# Patient Record
Sex: MALE | Race: WHITE | Employment: OTHER
[De-identification: names, ages, dates, MRNs, and addresses within clinical notes are randomized per-mention and may not be internally consistent; named-entity substitution may affect disease eponyms.]

---

## 2021-05-03 ENCOUNTER — TRANSCRIBE ORDERS (OUTPATIENT)
Dept: OTHER | Age: 81
End: 2021-05-03

## 2021-05-03 DIAGNOSIS — Z95.1 S/P CABG (CORONARY ARTERY BYPASS GRAFT): Primary | ICD-10-CM

## 2021-05-04 ENCOUNTER — TRANSCRIBE ORDERS (OUTPATIENT)
Dept: OTHER | Age: 81
End: 2021-05-04

## 2021-05-14 ENCOUNTER — HOSPITAL ENCOUNTER (OUTPATIENT)
Dept: CARDIAC REHAB | Facility: CLINIC | Age: 81
End: 2021-05-14
Attending: THORACIC SURGERY (CARDIOTHORACIC VASCULAR SURGERY)
Payer: COMMERCIAL

## 2021-05-14 DIAGNOSIS — Z95.1 S/P CABG (CORONARY ARTERY BYPASS GRAFT): ICD-10-CM

## 2021-05-14 PROCEDURE — 93797 PHYS/QHP OP CAR RHAB WO ECG: CPT | Mod: XU

## 2021-05-14 PROCEDURE — 93798 PHYS/QHP OP CAR RHAB W/ECG: CPT

## 2021-05-19 ENCOUNTER — HOSPITAL ENCOUNTER (OUTPATIENT)
Dept: CARDIAC REHAB | Facility: CLINIC | Age: 81
End: 2021-05-19
Attending: THORACIC SURGERY (CARDIOTHORACIC VASCULAR SURGERY)
Payer: COMMERCIAL

## 2021-05-19 PROCEDURE — 93798 PHYS/QHP OP CAR RHAB W/ECG: CPT

## 2021-05-20 ENCOUNTER — HOSPITAL ENCOUNTER (OUTPATIENT)
Dept: CARDIAC REHAB | Facility: CLINIC | Age: 81
End: 2021-05-20
Attending: THORACIC SURGERY (CARDIOTHORACIC VASCULAR SURGERY)
Payer: COMMERCIAL

## 2021-05-20 PROCEDURE — 93798 PHYS/QHP OP CAR RHAB W/ECG: CPT | Performed by: REHABILITATION PRACTITIONER

## 2021-05-26 ENCOUNTER — HOSPITAL ENCOUNTER (OUTPATIENT)
Dept: CARDIAC REHAB | Facility: CLINIC | Age: 81
End: 2021-05-26
Attending: THORACIC SURGERY (CARDIOTHORACIC VASCULAR SURGERY)
Payer: COMMERCIAL

## 2021-05-26 PROCEDURE — 93798 PHYS/QHP OP CAR RHAB W/ECG: CPT | Performed by: REHABILITATION PRACTITIONER

## 2021-05-27 ENCOUNTER — HOSPITAL ENCOUNTER (OUTPATIENT)
Dept: CARDIAC REHAB | Facility: CLINIC | Age: 81
End: 2021-05-27
Attending: THORACIC SURGERY (CARDIOTHORACIC VASCULAR SURGERY)
Payer: COMMERCIAL

## 2021-05-27 PROCEDURE — 93798 PHYS/QHP OP CAR RHAB W/ECG: CPT

## 2021-06-01 ENCOUNTER — HOSPITAL ENCOUNTER (OUTPATIENT)
Dept: CARDIAC REHAB | Facility: CLINIC | Age: 81
End: 2021-06-01
Attending: THORACIC SURGERY (CARDIOTHORACIC VASCULAR SURGERY)
Payer: COMMERCIAL

## 2021-06-01 PROCEDURE — 93798 PHYS/QHP OP CAR RHAB W/ECG: CPT

## 2021-06-03 ENCOUNTER — HOSPITAL ENCOUNTER (OUTPATIENT)
Dept: CARDIAC REHAB | Facility: CLINIC | Age: 81
End: 2021-06-03
Attending: THORACIC SURGERY (CARDIOTHORACIC VASCULAR SURGERY)
Payer: COMMERCIAL

## 2021-06-03 PROCEDURE — 93798 PHYS/QHP OP CAR RHAB W/ECG: CPT | Performed by: REHABILITATION PRACTITIONER

## 2021-06-08 ENCOUNTER — HOSPITAL ENCOUNTER (OUTPATIENT)
Dept: CARDIAC REHAB | Facility: CLINIC | Age: 81
End: 2021-06-08
Attending: THORACIC SURGERY (CARDIOTHORACIC VASCULAR SURGERY)
Payer: COMMERCIAL

## 2021-06-08 PROCEDURE — 93798 PHYS/QHP OP CAR RHAB W/ECG: CPT

## 2021-06-15 ENCOUNTER — HOSPITAL ENCOUNTER (OUTPATIENT)
Dept: CARDIAC REHAB | Facility: CLINIC | Age: 81
End: 2021-06-15
Attending: THORACIC SURGERY (CARDIOTHORACIC VASCULAR SURGERY)
Payer: COMMERCIAL

## 2021-06-15 PROCEDURE — 93798 PHYS/QHP OP CAR RHAB W/ECG: CPT

## 2021-06-17 ENCOUNTER — HOSPITAL ENCOUNTER (OUTPATIENT)
Dept: CARDIAC REHAB | Facility: CLINIC | Age: 81
End: 2021-06-17
Attending: THORACIC SURGERY (CARDIOTHORACIC VASCULAR SURGERY)
Payer: COMMERCIAL

## 2021-06-17 PROCEDURE — 93798 PHYS/QHP OP CAR RHAB W/ECG: CPT

## 2021-06-22 ENCOUNTER — HOSPITAL ENCOUNTER (OUTPATIENT)
Dept: CARDIAC REHAB | Facility: CLINIC | Age: 81
End: 2021-06-22
Attending: THORACIC SURGERY (CARDIOTHORACIC VASCULAR SURGERY)
Payer: COMMERCIAL

## 2021-06-22 PROCEDURE — 93798 PHYS/QHP OP CAR RHAB W/ECG: CPT

## 2021-06-24 ENCOUNTER — HOSPITAL ENCOUNTER (OUTPATIENT)
Dept: CARDIAC REHAB | Facility: CLINIC | Age: 81
End: 2021-06-24
Attending: THORACIC SURGERY (CARDIOTHORACIC VASCULAR SURGERY)
Payer: COMMERCIAL

## 2021-06-24 PROCEDURE — 93798 PHYS/QHP OP CAR RHAB W/ECG: CPT

## 2021-07-06 ENCOUNTER — HOSPITAL ENCOUNTER (OUTPATIENT)
Dept: CARDIAC REHAB | Facility: CLINIC | Age: 81
End: 2021-07-06
Attending: THORACIC SURGERY (CARDIOTHORACIC VASCULAR SURGERY)
Payer: COMMERCIAL

## 2021-07-06 PROCEDURE — 93798 PHYS/QHP OP CAR RHAB W/ECG: CPT | Performed by: OCCUPATIONAL THERAPIST

## 2021-07-08 ENCOUNTER — HOSPITAL ENCOUNTER (OUTPATIENT)
Dept: CARDIAC REHAB | Facility: CLINIC | Age: 81
End: 2021-07-08
Attending: THORACIC SURGERY (CARDIOTHORACIC VASCULAR SURGERY)
Payer: COMMERCIAL

## 2021-07-08 PROCEDURE — 93798 PHYS/QHP OP CAR RHAB W/ECG: CPT

## 2021-07-15 ENCOUNTER — HOSPITAL ENCOUNTER (OUTPATIENT)
Dept: CARDIAC REHAB | Facility: CLINIC | Age: 81
End: 2021-07-15
Attending: THORACIC SURGERY (CARDIOTHORACIC VASCULAR SURGERY)
Payer: COMMERCIAL

## 2021-07-15 PROCEDURE — 93798 PHYS/QHP OP CAR RHAB W/ECG: CPT

## 2025-01-05 ENCOUNTER — HOSPITAL ENCOUNTER (EMERGENCY)
Facility: CLINIC | Age: 85
Discharge: HOME | End: 2025-01-05
Attending: EMERGENCY MEDICINE | Admitting: EMERGENCY MEDICINE
Payer: COMMERCIAL

## 2025-01-05 VITALS
DIASTOLIC BLOOD PRESSURE: 67 MMHG | OXYGEN SATURATION: 95 % | TEMPERATURE: 97.2 F | HEIGHT: 68 IN | SYSTOLIC BLOOD PRESSURE: 166 MMHG | WEIGHT: 182.98 LBS | RESPIRATION RATE: 17 BRPM | BODY MASS INDEX: 27.73 KG/M2 | HEART RATE: 76 BPM

## 2025-01-05 DIAGNOSIS — R04.0 EPISTAXIS: ICD-10-CM

## 2025-01-05 PROCEDURE — 30901 CONTROL OF NOSEBLEED: CPT | Performed by: EMERGENCY MEDICINE

## 2025-01-05 PROCEDURE — 99283 EMERGENCY DEPT VISIT LOW MDM: CPT | Mod: 25 | Performed by: EMERGENCY MEDICINE

## 2025-01-05 RX ORDER — TRANEXAMIC ACID 100 MG/ML
500 INJECTION, SOLUTION INTRAVENOUS ONCE
Status: DISCONTINUED | OUTPATIENT
Start: 2025-01-05 | End: 2025-01-06 | Stop reason: HOSPADM

## 2025-01-05 RX ORDER — OXYMETAZOLINE HYDROCHLORIDE 0.05 G/100ML
2 SPRAY NASAL ONCE
Status: DISCONTINUED | OUTPATIENT
Start: 2025-01-05 | End: 2025-01-06 | Stop reason: HOSPADM

## 2025-01-05 ASSESSMENT — ACTIVITIES OF DAILY LIVING (ADL)
ADLS_ACUITY_SCORE: 41

## 2025-01-05 ASSESSMENT — COLUMBIA-SUICIDE SEVERITY RATING SCALE - C-SSRS
6. HAVE YOU EVER DONE ANYTHING, STARTED TO DO ANYTHING, OR PREPARED TO DO ANYTHING TO END YOUR LIFE?: NO
2. HAVE YOU ACTUALLY HAD ANY THOUGHTS OF KILLING YOURSELF IN THE PAST MONTH?: NO
1. IN THE PAST MONTH, HAVE YOU WISHED YOU WERE DEAD OR WISHED YOU COULD GO TO SLEEP AND NOT WAKE UP?: NO

## 2025-01-06 NOTE — ED PROVIDER NOTES
Emergency Department Note      History of Present Illness     Chief Complaint   Epistaxis (/)      HPI   Rohan Guzmán is a 84 year old male with a past medical history significant for CVA, atrial fibrillation, atherosclerosis, HTN, CAD, carotid stenosis, type II DM, malignant neoplasm of urinary bladder and BCC who presents to the emergency department with wife for evaluation of epistaxis. He reports that he has been experiencing an episode of epistaxis for the past three to four hours, and it seems to be mostly draining from his left nostril but feels it is also starting to drain from his right. He does have a nasal clamp on at bedside to control the bleeding, which was placed in triage when he arrived. He reports it is now running down the back of his throat. He denies any trauma or injury to his nose. No clear trigger. He did recently have bladder surgery done two days ago, but none to his face or nose. He denies taking any blood thinners, but does take a daily aspirin. He has had previous episodes of epistaxis, usually confined to his left nostril, but states that he is usually able to keep the bleeding under control on his own. He denies any other symptoms.     Independent Historian   Wife as detailed above.    Review of External Notes   None    Past Medical History   Medical History and Problem List   Sacrum and coccyx fracture  Psoriasis with arthropathy  Fracture of left acetabulum  Fracture of left ischium  Hand dysfunction  Dupuytren's contracture  CVA  Paroxysmal atrial fibrillation  Atherosclerosis  Hypogonadism  Horseshoe tear of retine  Tinnitus  Gout  HTN  CAD  Psoriatic arthropathy  Glaucoma  Carotid stenosis  Type II DM  Malignant neoplasm of urinary bladder  BCC  Psoriasis vulgaris    Medications   Ixekizumab  testosterone  Acyclovir  Aspirin 81 mg  Brimonidine  Clopidogrel  Febuxostat  Furosemide  Latanoprost  Losartan  Metoprolol  "tartrate  Lidocaine  Rosuvastatin  Sennosides  Metformin  Doxazosin  Eplerenone  oxycodone    Surgical History   CABG  Shoulder surgery  T&A  Vasectomy  Retinopathy and cryotherapy  Retinal surgery  Cataract removal  Laser procedure for retinal tear  Pelvis closed reduction  Bladder surgery  Physical Exam     Patient Vitals for the past 24 hrs:   BP Temp Temp src Pulse Resp SpO2 Height Weight   01/05/25 1855 (!) 166/67 97.2  F (36.2  C) Temporal 76 17 95 % -- 83 kg (182 lb 15.7 oz)   01/05/25 1852 -- -- -- -- -- -- 1.727 m (5' 8\") --     Physical Exam  General: Well appearing, nontoxic. Resting comfortably  Head:  Scalp, face, and head appear normal  Eyes:  Pupils equal, round    Conjunctivae noninjected and sclera white  ENT:    The nose is normal. Nose non tender to palpation without swelling or erythema. Mild oozing bleeding from the left nasopharynx. Nasal septum normal. Anterior turbinates normal. Source of bleeding not able to be visualized. Right nasopharynx, nare, and nasal septum normal.     The oropharynx is normal, mucous membranes moist. Uvula is in the midline. Posterior pharynx without erythema, swelling, exudates.  Blood clots visualized in the posterior pharynx. No active/brisk bleeding seen in the posterior pharynx.     Ears/pinnae are normal  Neck:  Normal range of motion  MSK:  Normal tone  Skin:  No rash or lesions noted.  Neuro:  Speech is normal and fluent    Moves all extremities spontaneously  Psych: Awake, Alert. Normal affect      Appropriate interactions          Diagnostics     Lab Results   Labs Ordered and Resulted from Time of ED Arrival to Time of ED Departure - No data to display    Imaging   No orders to display       EKG   None    Independent Interpretation   None    ED Course      Medications Administered   Medications   oxymetazoline (AFRIN) 0.05 % spray 2 spray (has no administration in time range)   silver nitrate (ARZOL) Misc 4 applicator (has no administration in time range) "   tranexamic acid (CYKLOKAPRON) spray 500 mg (has no administration in time range)       Procedures   Procedures     Epistaxis Care     Procedure: Epistaxis Care    Indication: Epistaxis    Consent: Verbal    Medication: Patient was topically medicated with Tranexamic acid    Procedure detail: Pre-wetted (with above TXA) Rapid Rhino (balloon) was gently inserted and insufflated. 5.5 cm. Patient was closely monitored and did not have evidence of recurrent bleeding.     Patient Status: The patient tolerated the procedure well: Yes. There were no complications.      Discussion of Management   None    ED Course   ED Course as of 01/05/25 2219   Sun Jan 05, 2025 1957 I obtained history and examined the patient as noted above.     2006 I rechecked patient and attempted to control bleeding of epistaxis using cautery.   2016 I rechecked the patient and updated. Performed additional epistaxis care including nasal packing.   2146 I rechecked the patient and updated. I discussed plan for discharge home.   Past medical records, nursing notes, and vitals reviewed.  1957: I performed an exam of the patient and obtained history, as documented above. GCS 15.      Additional Documentation  None    Medical Decision Making / Diagnosis     CMS Diagnoses: None    MIPS       None    [facility]   Rohan Guzmán is a 84 year old male who presented to the emergency department with left-sided epistaxis.  Source of bleeding could not be visualized on exam.  There is no site amenable to cautery.  Therefore a 5.5 cm rapid Rhino nasal packing balloon was placed.  The balloon was soaked in tranexamic acid prior to placement.  It was inflated with 3 mL of air.  The patient was monitored following nasal packing placement and there was complete resolution of bleeding.  No evidence for any posterior bleeding.  No trauma.  No bleeding seen in the right side of the nose.  Posterior pharynx is otherwise normal.  Discussed with patient the need for close  follow-up with ENT for packing removal in 2 to 3 days.  Return precautions were provided.  Patient was discharged in stable and improved condition.    Disposition   The patient was discharged.     Diagnosis     ICD-10-CM    1. Epistaxis  R04.0 Adult ENT  Referral           Discharge Medications   There are no discharge medications for this patient.        Scribe Disclosure:  I, Heidi Clark, am serving as a scribe at 8:09 PM on 1/5/2025 to document services personally performed by Nikolai Lyamn MD based on my observations and the provider's statements to me.        Nikolai Lyman MD  01/05/25 0056

## 2025-01-06 NOTE — ED TRIAGE NOTES
Pt complains of nose bleed 2 hours PTA out of the L nostril. Pt takes a daily aspirin. Nose clamp applied in triage.

## 2025-07-03 ENCOUNTER — DOCUMENTATION ONLY (OUTPATIENT)
Dept: GERIATRICS | Facility: CLINIC | Age: 85
End: 2025-07-03
Payer: COMMERCIAL

## 2025-07-07 ENCOUNTER — TRANSITIONAL CARE UNIT VISIT (OUTPATIENT)
Dept: GERIATRICS | Facility: CLINIC | Age: 85
End: 2025-07-07
Payer: COMMERCIAL

## 2025-07-07 ENCOUNTER — LAB REQUISITION (OUTPATIENT)
Dept: LAB | Facility: CLINIC | Age: 85
End: 2025-07-07
Payer: COMMERCIAL

## 2025-07-07 VITALS
WEIGHT: 164.2 LBS | TEMPERATURE: 97.6 F | HEIGHT: 68 IN | OXYGEN SATURATION: 98 % | HEART RATE: 88 BPM | DIASTOLIC BLOOD PRESSURE: 79 MMHG | RESPIRATION RATE: 16 BRPM | SYSTOLIC BLOOD PRESSURE: 163 MMHG | BODY MASS INDEX: 24.89 KG/M2

## 2025-07-07 DIAGNOSIS — D50.0 IRON DEFICIENCY ANEMIA DUE TO CHRONIC BLOOD LOSS: ICD-10-CM

## 2025-07-07 DIAGNOSIS — C67.9 MALIGNANT NEOPLASM OF BLADDER, UNSPECIFIED (H): ICD-10-CM

## 2025-07-07 DIAGNOSIS — E08.29 DIABETES MELLITUS DUE TO UNDERLYING CONDITION, CONTROLLED, WITH MICROALBUMINURIA, WITHOUT LONG-TERM CURRENT USE OF INSULIN (H): ICD-10-CM

## 2025-07-07 DIAGNOSIS — Z90.79 STATUS POST RADICAL CYSTOPROSTATECTOMY: ICD-10-CM

## 2025-07-07 DIAGNOSIS — Z90.6 STATUS POST RADICAL CYSTOPROSTATECTOMY: ICD-10-CM

## 2025-07-07 DIAGNOSIS — Z95.1 HISTORY OF CORONARY ARTERY BYPASS SURGERY: Chronic | ICD-10-CM

## 2025-07-07 DIAGNOSIS — R80.9 DIABETES MELLITUS DUE TO UNDERLYING CONDITION, CONTROLLED, WITH MICROALBUMINURIA, WITHOUT LONG-TERM CURRENT USE OF INSULIN (H): ICD-10-CM

## 2025-07-07 DIAGNOSIS — I10 PRIMARY HYPERTENSION: ICD-10-CM

## 2025-07-07 DIAGNOSIS — C67.9 MALIGNANT NEOPLASM OF URINARY BLADDER, UNSPECIFIED SITE (H): Primary | ICD-10-CM

## 2025-07-07 PROBLEM — D61.818 PANCYTOPENIA (H): Status: ACTIVE | Noted: 2024-12-20

## 2025-07-07 PROBLEM — H40.1123 PRIMARY OPEN ANGLE GLAUCOMA (POAG) OF LEFT EYE, SEVERE STAGE: Status: ACTIVE | Noted: 2022-03-16

## 2025-07-07 PROBLEM — S32.412A: Status: ACTIVE | Noted: 2022-01-30

## 2025-07-07 PROBLEM — S32.602A: Status: ACTIVE | Noted: 2022-01-30

## 2025-07-07 PROBLEM — I25.10 ATHEROSCLEROSIS OF NATIVE CORONARY ARTERY OF NATIVE HEART: Status: ACTIVE | Noted: 2021-04-13

## 2025-07-07 PROBLEM — L40.50 PSORIASIS WITH ARTHROPATHY (H): Status: ACTIVE | Noted: 2022-01-30

## 2025-07-07 PROBLEM — I48.0 PAROXYSMAL ATRIAL FIBRILLATION (H): Status: ACTIVE | Noted: 2021-05-07

## 2025-07-07 PROBLEM — Z82.49: Status: ACTIVE | Noted: 2025-06-28

## 2025-07-07 PROBLEM — R29.898 OTHER SYMPTOMS AND SIGNS INVOLVING THE MUSCULOSKELETAL SYSTEM: Status: ACTIVE | Noted: 2021-11-03

## 2025-07-07 PROBLEM — R29.898 HAND DYSFUNCTION: Status: ACTIVE | Noted: 2021-11-03

## 2025-07-07 PROBLEM — I63.9 CEREBROVASCULAR ACCIDENT (CVA) (H): Status: ACTIVE | Noted: 2021-05-07

## 2025-07-07 PROBLEM — S32.10XA CLOSED FRACTURE OF SACRUM (H): Status: ACTIVE | Noted: 2022-02-01

## 2025-07-07 PROBLEM — L40.0 PSORIASIS VULGARIS: Status: ACTIVE | Noted: 2023-05-26

## 2025-07-07 PROBLEM — E11.65 UNCONTROLLED TYPE 2 DIABETES MELLITUS WITH HYPERGLYCEMIA (H): Status: ACTIVE | Noted: 2024-10-25

## 2025-07-07 PROBLEM — T14.90XA TRAUMA: Status: ACTIVE | Noted: 2022-01-31

## 2025-07-07 PROBLEM — E87.6 ACUTE HYPOKALEMIA: Status: ACTIVE | Noted: 2025-06-28

## 2025-07-07 PROBLEM — Z86.006 PERSONAL HISTORY OF MELANOMA IN-SITU: Status: ACTIVE | Noted: 2024-02-22

## 2025-07-07 PROBLEM — I65.21 OCCLUSION AND STENOSIS OF RIGHT CAROTID ARTERY: Status: ACTIVE | Noted: 2025-04-10

## 2025-07-07 PROBLEM — M72.0 DUPUYTREN'S CONTRACTURE: Status: ACTIVE | Noted: 2021-08-23

## 2025-07-07 PROBLEM — H40.001 GLAUCOMA SUSPECT OF RIGHT EYE: Status: ACTIVE | Noted: 2022-03-16

## 2025-07-07 PROBLEM — R50.82 POSTOPERATIVE FEVER: Status: ACTIVE | Noted: 2025-06-28

## 2025-07-07 PROBLEM — Z85.828 HISTORY OF BASAL CELL CARCINOMA: Status: ACTIVE | Noted: 2024-02-22

## 2025-07-07 PROCEDURE — 99310 SBSQ NF CARE HIGH MDM 45: CPT | Performed by: PHYSICIAN ASSISTANT

## 2025-07-07 RX ORDER — ENOXAPARIN SODIUM 100 MG/ML
40 INJECTION SUBCUTANEOUS DAILY
COMMUNITY
Start: 2025-07-03 | End: 2025-07-31

## 2025-07-07 RX ORDER — AMOXICILLIN 500 MG/1
500 CAPSULE ORAL 3 TIMES DAILY
COMMUNITY
Start: 2025-06-16 | End: 2025-07-07

## 2025-07-07 RX ORDER — DORZOLAMIDE HYDROCHLORIDE AND TIMOLOL MALEATE 20; 5 MG/ML; MG/ML
1 SOLUTION/ DROPS OPHTHALMIC 2 TIMES DAILY
COMMUNITY
Start: 2024-03-27

## 2025-07-07 RX ORDER — ASPIRIN 81 MG/1
81 TABLET, COATED ORAL DAILY
COMMUNITY
Start: 2025-07-03

## 2025-07-07 RX ORDER — ACYCLOVIR 200 MG/1
200 CAPSULE ORAL DAILY
COMMUNITY
Start: 2025-07-03 | End: 2025-07-07

## 2025-07-07 RX ORDER — METOPROLOL TARTRATE 25 MG/1
12.5 TABLET, FILM COATED ORAL 2 TIMES DAILY
COMMUNITY
Start: 2025-07-03

## 2025-07-07 RX ORDER — FEBUXOSTAT 40 MG/1
40 TABLET, FILM COATED ORAL DAILY
COMMUNITY
Start: 2025-07-03

## 2025-07-07 RX ORDER — OXYCODONE HYDROCHLORIDE 5 MG/1
5 TABLET ORAL EVERY 4 HOURS PRN
COMMUNITY
Start: 2025-01-03 | End: 2025-07-07

## 2025-07-07 RX ORDER — OXYCODONE HYDROCHLORIDE 5 MG/1
TABLET ORAL
Qty: 15 TABLET | Refills: 0 | Status: SHIPPED | OUTPATIENT
Start: 2025-07-07 | End: 2025-07-10

## 2025-07-07 RX ORDER — BRIMONIDINE TARTRATE 2 MG/ML
1 SOLUTION/ DROPS OPHTHALMIC 2 TIMES DAILY
COMMUNITY
Start: 2025-07-03

## 2025-07-07 RX ORDER — HYDROCHLOROTHIAZIDE 12.5 MG/1
CAPSULE ORAL
COMMUNITY

## 2025-07-07 RX ORDER — AMOXICILLIN 250 MG
1 CAPSULE ORAL 2 TIMES DAILY PRN
Status: SHIPPED
Start: 2025-07-07

## 2025-07-07 RX ORDER — LOSARTAN POTASSIUM 50 MG/1
50 TABLET ORAL DAILY
COMMUNITY
Start: 2025-07-03

## 2025-07-07 RX ORDER — EPLERENONE 25 MG/1
25 TABLET ORAL DAILY
COMMUNITY
Start: 2024-10-23 | End: 2026-07-03

## 2025-07-07 RX ORDER — ROSUVASTATIN CALCIUM 20 MG/1
20 TABLET, COATED ORAL DAILY
COMMUNITY
Start: 2025-07-03

## 2025-07-07 RX ORDER — METFORMIN HYDROCHLORIDE 500 MG/1
1000 TABLET, EXTENDED RELEASE ORAL 2 TIMES DAILY WITH MEALS
COMMUNITY
Start: 2024-10-23

## 2025-07-07 RX ORDER — MULTIVITAMIN
1 TABLET ORAL DAILY
COMMUNITY
Start: 2025-07-03

## 2025-07-07 RX ORDER — LATANOPROST 50 UG/ML
1 SOLUTION/ DROPS OPHTHALMIC AT BEDTIME
COMMUNITY
Start: 2025-07-03

## 2025-07-07 RX ORDER — AMOXICILLIN 250 MG
1 CAPSULE ORAL DAILY
COMMUNITY
Start: 2025-07-03 | End: 2025-07-07

## 2025-07-07 NOTE — PROGRESS NOTES
[facility] GERIATRICS    PRIMARY CARE PROVIDER AND CLINIC:  Carmina Huntley MD, [facility] [address] / [place]  Chief Complaint   Patient presents with    Hospital F/U      [facility] Medical Record Number:  [id number]  Place of Service where encounter took place:  [facility] ([facility]) [[id number]]    Rohan Guzmán  is a 84 year old  (1940), admitted to the above facility from  [facility] . Hospital stay 6/27/25 through 7/4/25..   HPI:    Notes from hospitalization reviewed including H&P, Endocrinology consult, Internal medicine consult,  and D/c summary    Rohan Guzmán is a 84-year-old male with a past medical history of malignant neoplasm of the urinary bladder, type 2 diabetes, chronic iron deficiency anemia, history of bypass surgery, CAD, hypertension with recent hospitalization at [facility].  Admitted for planned robotic cystoprostatectomy with bilateral lymph node dissection and ileal conduit diversion on 6/27/2025.  Postoperative course uncomplicated.  Discharged to U for further therapies    SIMEON is evaluated in his room.  Overall doing well.  Reviewed current pain management.  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  Would like to take oxycodone on a schedule at 8 PM and 2:00 in the morning.  Would like to be awakened to take medications.  Does not feel as though he will likely take it more frequently than the 2 scheduled doses.  Urostomy functioning well.  Attaches it to Fowler bag overnight to avoid having to empty.  Denies chest pain or shortness of breath.  Bowels moving well.  Would like to take senna on an as needed basis.    Review of nursing home EMR: -166 Weight 164  -235    CODE STATUS/ADVANCE DIRECTIVES DISCUSSION:  No Order  CPR/Full code   ALLERGIES:   Allergies   Allergen Reactions    Hydrochlorothiazide Dermatitis    Allopurinol Rash    Sulfa Antibiotics Rash       PAST MEDICAL HISTORY: Per HPI  PAST SURGICAL HISTORY:   has no past surgical history on file.  Patient's living condition: lives with spouse  House-2 level. No walker. Drives    Post Discharge Medication Reconciliation Status:   MED REC REQUIRED  Post Medication Reconciliation Status: discharge medications reconciled and changed, per note/orders       Current Outpatient Medications   Medication Sig Dispense Refill    aspirin (ASA) 81 MG EC tablet Take 81 mg by mouth daily. Monitor for bruising/bleeding. Notify MD/NP of concerns.      brimonidine (ALPHAGAN) 0.2 % ophthalmic solution Place 1 drop Into the left eye 2 times daily. Eye drops not to be administered within 5 minutes of each other.      Continuous Glucose Sensor (FREESTYLE TEE 3 PLUS SENSOR) MISC Change every 15 days.      dorzolamide-timolol (COSOPT) 2-0.5 % ophthalmic solution Place 1 drop Into the left eye 2 times daily. Eye drops not to be administered within 5 minutes of each other.      enoxaparin ANTICOAGULANT (LOVENOX) 40 MG/0.4ML syringe Inject 40 mg subcutaneously daily.  Monitor for bruising/bleeding. Notify MD/NP of concerns.      eplerenone (INSPRA) 25 MG tablet Take 25 mg by mouth daily.      febuxostat (ULORIC) 40 MG TABS tablet Take 40 mg by mouth daily.      latanoprost (XALATAN) 0.005 % ophthalmic solution Place 1 drop Into the left eye at bedtime. Eye drops not to be administered within 5 minutes of each other.      losartan (COZAAR) 50 MG tablet Take 50 mg by mouth daily.      metFORMIN (GLUCOPHAGE XR) 500 MG 24 hr tablet Take 1,000 mg by mouth 2 times daily (with meals).      metoprolol tartrate (LOPRESSOR) 25 MG tablet Take 12.5 mg by mouth 2 times daily.      Multiple vitamin TABS Take 1 tablet by mouth daily.      oxyCODONE (ROXICODONE) 5 MG tablet Take 1 tablet (5 mg) by mouth 2 times daily. May also take 1 tablet (5 mg) 2 times daily as needed for pain. 15 tablet 0    rosuvastatin (CRESTOR) 20 MG tablet Take 20 mg by mouth  "daily.      senna-docusate (SENOKOT-S/PERICOLACE) 8.6-50 MG tablet Take 1 tablet by mouth 2 times daily as needed.      sodium chloride, PF, (NORMAL SALINE FLUSH) 0.9% PF flush Inject 1,000 mLs into the vein every 48 hours.      sodium chloride, PF, (NORMAL SALINE FLUSH) 0.9% PF flush Inject 10 mLs into catheter daily.      sodium chloride, PF, 0.9% 0.9% PF flush Inject 5 mLs into catheter as needed for line flush.       No current facility-administered medications for this visit.       ROS:  10 point ROS of systems including Constitutional, Eyes, Respiratory, Cardiovascular, Gastroenterology, Genitourinary, Integumentary, Musculoskeletal, Psychiatric were all negative except for pertinent positives noted in my HPI.    Vitals:  BP (!) 163/79   Pulse 88   Temp 97.6  F (36.4  C)   Resp 16   Ht 1.727 m (5' 8\")   Wt 74.5 kg (164 lb 3.2 oz)   SpO2 98%   BMI 24.97 kg/m    Exam:  Physical Exam  Vitals (In facility EMR) reviewed.   HENT:      Head: Normocephalic and atraumatic.   Eyes:      General: No scleral icterus.  Cardiovascular:      Rate and Rhythm: Normal rate and regular rhythm.   Pulmonary:      Effort: Pulmonary effort is normal.   Abdominal:      Comments: Urostomy in place, pink stoma   Musculoskeletal:      Right lower leg: No edema.      Left lower leg: No edema.   Skin:     General: Skin is warm and dry.      Findings: No rash.   Neurological:      Mental Status: He is alert. Mental status is at baseline.   Psychiatric:         Behavior: Behavior normal.           Lab/Diagnostic data:  Labs in CareEverywhere reviewed    FINAL DIAGNOSIS    A.  Ureter, right, margin, biopsy:  Negative for atypia or malignancy     B.  Lymph nodes, right pelvic, regional resection:  Nine lymph node negative for metastatic carcinoma (0/9)     C.  Lymph Nodes, left pelvic, regional resection:  Thirteen lymph nodes negative for metastatic carcinoma (0/13)     D.  Ureter, left, distal margin, biopsy:  Negative for atypia or " malignancy     E.  Bladder, cystoprostatectomy:  Invasive high grade urothelial carcinoma with invasion into inner half of muscularis propria  Size of the tumor is approximately 9.5 x 6.7 x 2.1 cm  Angiolymphatic invasion identified  One out of four lymph nodes positive for metastatic urothelial carcinoma (1/4)  Negative resection margins (see synoptic report below)  Prostate with acinar adenocarcinoma, [place] grade 3+4, 10% pattern 4, grade group 2  Tumor involves approximately 10% of the prostate gland  Perineural invasion identified  Seminal vesicle and ejaculatory ducts with no tumor involvement  Negative resection margins (see synoptic report below)  Nonneoplastic parenchyma prostate parenchyma with 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  Patient declines to take Tylenol  Continue IV fluids every other day x 14 days  DVT prophylaxis with Lovenox x 28 days  PT/OT  Post-op Follow up 7/15    Acute on chronic anemia: D/c Hgb 7.9  CBC 7/9    CAD  HFpEF  Hypertension  Continue losartan, eplerenone and metoprolol  Continue rosuvastatin and aspirin  Monitor vitals    DM2  Continue metformin  Monitor blood glucose    This note was completed in part using Dragon voice recognition software. Although reviewed after completion, some word and grammatical errors may occur.    A total > 45 min were spent on this hospital follow-up visit including review of hospitalization, medication reconciliation, evaluating patient discussing plan of care, writing orders and documenting.  All services performed on day of visit      Electronically signed by:  Reshma Polanco PA-C

## 2025-07-07 NOTE — LETTER
7/7/2025      Rohan Guzmán  [address] [facility] [place] [address]        [facility] GERIATRICS    PRIMARY CARE PROVIDER AND CLINIC:  Carmina Huntley MD, [facility] [address] / [place]  Chief Complaint   Patient presents with     Hospital F/U      [facility] Medical Record Number:  [id number]  Place of Service where encounter took place:  [facility] ([facility]) [[id number]]    Rohan Guzmán  is a 84 year old  (1940), admitted to the above facility from  [facility] . Hospital stay 6/27/25 through 7/4/25..   HPI:    Notes from hospitalization reviewed including H&P, Endocrinology consult, Internal medicine consult,  and D/c summary    Rohan Guzmán is a 84-year-old male with a past medical history of malignant neoplasm of the urinary bladder, type 2 diabetes, chronic iron deficiency anemia, history of bypass surgery, CAD, hypertension with recent hospitalization at [facility].  Admitted for planned robotic cystoprostatectomy with bilateral lymph node dissection and ileal conduit diversion on 6/27/2025.  Postoperative course uncomplicated.  Discharged to U for further therapies    SIMEON is evaluated in his room.  Overall doing well.  Reviewed current pain management.  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  Would like to take oxycodone on a schedule at 8 PM and 2:00 in the morning.  Would like to be awakened to take medications.  Does not feel as though he will likely take it more frequently than the 2 scheduled doses.  Urostomy functioning well.  Attaches it to Fowler bag overnight to avoid having to empty.  Denies chest pain or shortness of breath.  Bowels moving well.  Would like to take senna on an as needed basis.    Review of nursing home EMR: -166 Weight 164  -235    CODE STATUS/ADVANCE DIRECTIVES DISCUSSION:  No Order  CPR/Full code   ALLERGIES:   Allergies   Allergen Reactions      Hydrochlorothiazide Dermatitis     Allopurinol Rash     Sulfa Antibiotics Rash      PAST MEDICAL HISTORY: Per HPI  PAST SURGICAL HISTORY:   has no past surgical history on file.  Patient's living condition: lives with spouse  House-2 level. No walker. Drives    Post Discharge Medication Reconciliation Status:   MED REC REQUIRED  Post Medication Reconciliation Status: discharge medications reconciled and changed, per note/orders       Current Outpatient Medications   Medication Sig Dispense Refill     aspirin (ASA) 81 MG EC tablet Take 81 mg by mouth daily. Monitor for bruising/bleeding. Notify MD/NP of concerns.       brimonidine (ALPHAGAN) 0.2 % ophthalmic solution Place 1 drop Into the left eye 2 times daily. Eye drops not to be administered within 5 minutes of each other.       Continuous Glucose Sensor (FREESTYLE TEE 3 PLUS SENSOR) MISC Change every 15 days.       dorzolamide-timolol (COSOPT) 2-0.5 % ophthalmic solution Place 1 drop Into the left eye 2 times daily. Eye drops not to be administered within 5 minutes of each other.       enoxaparin ANTICOAGULANT (LOVENOX) 40 MG/0.4ML syringe Inject 40 mg subcutaneously daily.  Monitor for bruising/bleeding. Notify MD/NP of concerns.       eplerenone (INSPRA) 25 MG tablet Take 25 mg by mouth daily.       febuxostat (ULORIC) 40 MG TABS tablet Take 40 mg by mouth daily.       latanoprost (XALATAN) 0.005 % ophthalmic solution Place 1 drop Into the left eye at bedtime. Eye drops not to be administered within 5 minutes of each other.       losartan (COZAAR) 50 MG tablet Take 50 mg by mouth daily.       metFORMIN (GLUCOPHAGE XR) 500 MG 24 hr tablet Take 1,000 mg by mouth 2 times daily (with meals).       metoprolol tartrate (LOPRESSOR) 25 MG tablet Take 12.5 mg by mouth 2 times daily.       Multiple vitamin TABS Take 1 tablet by mouth daily.       oxyCODONE (ROXICODONE) 5 MG tablet Take 1 tablet (5 mg) by mouth 2 times daily. May also take 1 tablet (5 mg) 2 times  "daily as needed for pain. 15 tablet 0     rosuvastatin (CRESTOR) 20 MG tablet Take 20 mg by mouth daily.       senna-docusate (SENOKOT-S/PERICOLACE) 8.6-50 MG tablet Take 1 tablet by mouth 2 times daily as needed.       sodium chloride, PF, (NORMAL SALINE FLUSH) 0.9% PF flush Inject 1,000 mLs into the vein every 48 hours.       sodium chloride, PF, (NORMAL SALINE FLUSH) 0.9% PF flush Inject 10 mLs into catheter daily.       sodium chloride, PF, 0.9% 0.9% PF flush Inject 5 mLs into catheter as needed for line flush.       No current facility-administered medications for this visit.       ROS:  10 point ROS of systems including Constitutional, Eyes, Respiratory, Cardiovascular, Gastroenterology, Genitourinary, Integumentary, Musculoskeletal, Psychiatric were all negative except for pertinent positives noted in my HPI.    Vitals:  BP (!) 163/79   Pulse 88   Temp 97.6  F (36.4  C)   Resp 16   Ht 1.727 m (5' 8\")   Wt 74.5 kg (164 lb 3.2 oz)   SpO2 98%   BMI 24.97 kg/m    Exam:  Physical Exam  Vitals (In facility EMR) reviewed.   HENT:      Head: Normocephalic and atraumatic.   Eyes:      General: No scleral icterus.  Cardiovascular:      Rate and Rhythm: Normal rate and regular rhythm.   Pulmonary:      Effort: Pulmonary effort is normal.   Abdominal:      Comments: Urostomy in place, pink stoma   Musculoskeletal:      Right lower leg: No edema.      Left lower leg: No edema.   Skin:     General: Skin is warm and dry.      Findings: No rash.   Neurological:      Mental Status: He is alert. Mental status is at baseline.   Psychiatric:         Behavior: Behavior normal.           Lab/Diagnostic data:  Labs in CareEverywhere reviewed    FINAL DIAGNOSIS    A.  Ureter, right, margin, biopsy:  Negative for atypia or malignancy     B.  Lymph nodes, right pelvic, regional resection:  Nine lymph node negative for metastatic carcinoma (0/9)     C.  Lymph Nodes, left pelvic, regional resection:  Thirteen lymph nodes negative " for metastatic carcinoma (0/13)     D.  Ureter, left, distal margin, biopsy:  Negative for atypia or malignancy     E.  Bladder, cystoprostatectomy:  Invasive high grade urothelial carcinoma with invasion into inner half of muscularis propria  Size of the tumor is approximately 9.5 x 6.7 x 2.1 cm  Angiolymphatic invasion identified  One out of four lymph nodes positive for metastatic urothelial carcinoma (1/4)  Negative resection margins (see synoptic report below)  Prostate with acinar adenocarcinoma, [place] grade 3+4, 10% pattern 4, grade group 2  Tumor involves approximately 10% of the prostate gland  Perineural invasion identified  Seminal vesicle and ejaculatory ducts with no tumor involvement  Negative resection margins (see synoptic report below)  Nonneoplastic parenchyma prostate parenchyma with 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  Patient declines to take Tylenol  Continue IV fluids every other day x 14 days  DVT prophylaxis with Lovenox x 28 days  PT/OT  Post-op Follow up 7/15    Acute on chronic anemia: D/c Hgb 7.9  CBC 7/9    CAD  HFpEF  Hypertension  Continue losartan, eplerenone and metoprolol  Continue rosuvastatin and aspirin  Monitor vitals    DM2  Continue metformin  Monitor blood glucose    This note was completed in part using Dragon voice recognition software. Although reviewed after completion, some word and grammatical errors may occur.    A total > 45 min were spent on this hospital follow-up visit including review of hospitalization, medication reconciliation, evaluating patient discussing plan of care, writing orders and documenting.  All services performed on day of visit      Electronically  signed by:  Reshma Polanco PA-C                    Sincerely,        Reshma Polanco PA-C    Electronically signed

## 2025-07-09 ENCOUNTER — LAB REQUISITION (OUTPATIENT)
Dept: LAB | Facility: CLINIC | Age: 85
End: 2025-07-09
Payer: COMMERCIAL

## 2025-07-09 DIAGNOSIS — Z11.1 ENCOUNTER FOR SCREENING FOR RESPIRATORY TUBERCULOSIS: ICD-10-CM

## 2025-07-09 LAB
ANION GAP SERPL CALCULATED.3IONS-SCNC: 7 MMOL/L (ref 7–15)
BUN SERPL-MCNC: 10.1 MG/DL (ref 8–23)
CALCIUM SERPL-MCNC: 8 MG/DL (ref 8.8–10.4)
CHLORIDE SERPL-SCNC: 109 MMOL/L (ref 98–107)
CREAT SERPL-MCNC: 0.89 MG/DL (ref 0.67–1.17)
EGFRCR SERPLBLD CKD-EPI 2021: 85 ML/MIN/1.73M2
ERYTHROCYTE [DISTWIDTH] IN BLOOD BY AUTOMATED COUNT: 14.6 % (ref 10–15)
GLUCOSE SERPL-MCNC: 122 MG/DL (ref 70–99)
HCO3 SERPL-SCNC: 24 MMOL/L (ref 22–29)
HCT VFR BLD AUTO: 27.5 % (ref 40–53)
HGB BLD-MCNC: 8.3 G/DL (ref 13.3–17.7)
MCH RBC QN AUTO: 27.9 PG (ref 26.5–33)
MCHC RBC AUTO-ENTMCNC: 30.2 G/DL (ref 31.5–36.5)
MCV RBC AUTO: 93 FL (ref 78–100)
PLATELET # BLD AUTO: 193 10E3/UL (ref 150–450)
POTASSIUM SERPL-SCNC: 3.8 MMOL/L (ref 3.4–5.3)
RBC # BLD AUTO: 2.97 10E6/UL (ref 4.4–5.9)
SODIUM SERPL-SCNC: 140 MMOL/L (ref 135–145)
WBC # BLD AUTO: 5.7 10E3/UL (ref 4–11)

## 2025-07-09 PROCEDURE — 36415 COLL VENOUS BLD VENIPUNCTURE: CPT | Performed by: PHYSICIAN ASSISTANT

## 2025-07-09 PROCEDURE — 85048 AUTOMATED LEUKOCYTE COUNT: CPT | Performed by: PHYSICIAN ASSISTANT

## 2025-07-09 PROCEDURE — P9604 ONE-WAY ALLOW PRORATED TRIP: HCPCS | Performed by: PHYSICIAN ASSISTANT

## 2025-07-09 PROCEDURE — 80048 BASIC METABOLIC PNL TOTAL CA: CPT | Performed by: PHYSICIAN ASSISTANT

## 2025-07-10 ENCOUNTER — TRANSITIONAL CARE UNIT VISIT (OUTPATIENT)
Dept: GERIATRICS | Facility: CLINIC | Age: 85
End: 2025-07-10
Payer: COMMERCIAL

## 2025-07-10 ENCOUNTER — ENROLLMENT (OUTPATIENT)
Dept: HOME HEALTH SERVICES | Facility: HOME HEALTH | Age: 85
End: 2025-07-10
Payer: MEDICARE

## 2025-07-10 VITALS
DIASTOLIC BLOOD PRESSURE: 59 MMHG | BODY MASS INDEX: 24.89 KG/M2 | RESPIRATION RATE: 14 BRPM | TEMPERATURE: 98 F | OXYGEN SATURATION: 98 % | HEIGHT: 68 IN | SYSTOLIC BLOOD PRESSURE: 135 MMHG | WEIGHT: 164.2 LBS | HEART RATE: 60 BPM

## 2025-07-10 DIAGNOSIS — Z90.6 ACQUIRED ABSENCE OF PART OF URINARY TRACT: Primary | ICD-10-CM

## 2025-07-10 DIAGNOSIS — C67.9 MALIGNANT NEOPLASM OF URINARY BLADDER, UNSPECIFIED SITE (H): ICD-10-CM

## 2025-07-10 DIAGNOSIS — C67.9 MALIGNANT NEOPLASM OF URINARY BLADDER (H): ICD-10-CM

## 2025-07-10 RX ORDER — OXYCODONE HYDROCHLORIDE 5 MG/1
TABLET ORAL
Qty: 12 TABLET | Refills: 0 | Status: SHIPPED | OUTPATIENT
Start: 2025-07-10

## 2025-07-10 NOTE — PROGRESS NOTES
"  Chief Complaint   Patient presents with    RECHECK       HPI:  Rohan Guzmán is a 84 year old  (1940), who is being seen today for an episodic care visit at: [facility] ([facility]) [[id number]].     Brief summary:     Today's concern is: ***        Review of nursing home EMR:-167, Wt 164, -161      Allergies, and PMH/PSH reviewed in [facility] today.    REVIEW OF SYSTEMS:  {oyjaic67:[id number]}    Objective:   /59   Pulse 60   Temp 98  F (36.7  C)   Resp 14   Ht 1.727 m (5' 8\")   Wt 74.5 kg (164 lb 3.2 oz)   SpO2 98%   BMI 24.97 kg/m      Physical Exam     MED REC REQUIRED{TIP  Click the link below to document or use med rec list, use list to pull in response :[id number]}  Post Medication Reconciliation Status: {MED REC LIST:681117}      {fgslab:683969}    Assessment/Plan:  Status post robotic cystoprostatectomy, urinary diversion, pelvic lymphadenectomy 6/27/2025  Malignant neoplasm of the urinary bladder:   Pathology results reviewed today  Adjust oxycodone to 5 mg twice daily scheduled and twice daily as needed.  Patient declines to take Tylenol  Continue IV fluids every other day x 14 days  DVT prophylaxis with Lovenox x 28 days  PT/OT  Post-op Follow up 7/15    Acute on chronic anemia: D/c Hgb 7.9  CBC 7/9    CAD  HFpEF  Hypertension  Continue losartan, eplerenone and metoprolol  Continue rosuvastatin and aspirin  Monitor vitals    DM2  Continue metformin  Monitor blood glucose    Orders:  {fgsorders:[id number]}  ***    Electronically signed by: Reshma Polanco PA-C     "

## 2025-07-10 NOTE — LETTER
7/10/2025      Rohan Guzmán  [id number] [facility] [id number]        No notes on file      Sincerely,        Resham Polanco PA-C    Electronically signed

## 2025-07-11 PROCEDURE — 86481 TB AG RESPONSE T-CELL SUSP: CPT | Performed by: INTERNAL MEDICINE

## 2025-07-11 PROCEDURE — P9603 ONE-WAY ALLOW PRORATED MILES: HCPCS | Performed by: INTERNAL MEDICINE

## 2025-07-11 PROCEDURE — 36415 COLL VENOUS BLD VENIPUNCTURE: CPT | Performed by: INTERNAL MEDICINE

## 2025-07-12 LAB
QUANTIFERON MITOGEN: 9.18 IU/ML
QUANTIFERON NIL TUBE: 0.04 IU/ML
QUANTIFERON TB1 TUBE: 0.07 IU/ML
QUANTIFERON TB2 TUBE: 0.06

## 2025-07-13 LAB
GAMMA INTERFERON BACKGROUND BLD IA-ACNC: 0.04 IU/ML
M TB IFN-G BLD-IMP: NEGATIVE
M TB IFN-G CD4+ BCKGRND COR BLD-ACNC: 9.14 IU/ML
MITOGEN IGNF BCKGRD COR BLD-ACNC: 0.02 IU/ML
MITOGEN IGNF BCKGRD COR BLD-ACNC: 0.03 IU/ML